# Patient Record
Sex: FEMALE | Race: BLACK OR AFRICAN AMERICAN | NOT HISPANIC OR LATINO | URBAN - METROPOLITAN AREA
[De-identification: names, ages, dates, MRNs, and addresses within clinical notes are randomized per-mention and may not be internally consistent; named-entity substitution may affect disease eponyms.]

---

## 2023-08-10 ENCOUNTER — OUTPATIENT (OUTPATIENT)
Dept: OUTPATIENT SERVICES | Facility: HOSPITAL | Age: 58
LOS: 1 days | End: 2023-08-10

## 2023-08-10 ENCOUNTER — APPOINTMENT (OUTPATIENT)
Dept: PLASTIC SURGERY | Facility: CLINIC | Age: 58
End: 2023-08-10

## 2023-08-10 VITALS
WEIGHT: 185 LBS | TEMPERATURE: 98.2 F | RESPIRATION RATE: 15 BRPM | SYSTOLIC BLOOD PRESSURE: 115 MMHG | BODY MASS INDEX: 30.82 KG/M2 | HEART RATE: 76 BPM | HEIGHT: 65 IN | DIASTOLIC BLOOD PRESSURE: 75 MMHG

## 2023-08-10 PROBLEM — Z00.00 ENCOUNTER FOR PREVENTIVE HEALTH EXAMINATION: Status: ACTIVE | Noted: 2023-08-10

## 2023-08-10 RX ORDER — SEMAGLUTIDE 1.34 MG/ML
2 INJECTION, SOLUTION SUBCUTANEOUS
Refills: 0 | Status: ACTIVE | COMMUNITY

## 2023-08-10 NOTE — ASSESSMENT
[FreeTextEntry1] : Exam:  Nose: palpable and mobile subcutaneous dorsal silicone implant, tip slightly underprojected and underrotated Face: full face and neck wth bilateral jowling, submental fullness, and skin laxity of the neck without platysmal bands Symmetric facial animation and grossly normal sensation  JERRICA REED is a 58 year old female patient with unsatisfactory cosmetic appearance of the nose, lower face, and neck. Regarding her nose, we discussed that given the presence of her silicone implant, I would recommend against additional surgery and any further filler injections at this time due to elevated complication risks. She was understanding of this. With regard to her jowling and neck laxity, we discussed that a face/necklift could potentially address these issues. The risks of surgery were discussed with the patient including but not limited to unsatisfactory cosmetic appearance, infection, bleeding, fluid collection, delayed wound healing, and temporary or permanent nerve injury. All questions and concerns were addressed to her satisfaction. She will take some time to consider her options and contact me if she is interested in pursuing surgery.

## 2023-08-10 NOTE — HISTORY OF PRESENT ILLNESS
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 The patient is specifically concerned with the appearance of her nasal tip. She feels it is underprojected and under rotated. The patient has previous history of rhinoplasty 1 year ago in which a dorsal silicone implant was placed. She additionally has had filler injection into the nasal tip, but feels this was unsuccessful in providing her with the desired aesthetic result. She additionally is concerned with the jowling appearance of her lower face and excess skin of her neck. She denies difficulty breathing through her nose, allergies, or frequent use of nasal decongestants. She has no prior history of abnormal bleeding, blood clotting, or adverse reactions to anesthesia.

## 2023-08-11 DIAGNOSIS — Z01.89 ENCOUNTER FOR OTHER SPECIFIED SPECIAL EXAMINATIONS: ICD-10-CM

## 2024-02-15 ENCOUNTER — APPOINTMENT (OUTPATIENT)
Dept: PLASTIC SURGERY | Facility: CLINIC | Age: 59
End: 2024-02-15

## 2024-02-15 ENCOUNTER — OUTPATIENT (OUTPATIENT)
Dept: OUTPATIENT SERVICES | Facility: HOSPITAL | Age: 59
LOS: 1 days | End: 2024-02-15

## 2024-02-15 DIAGNOSIS — Z01.818 ENCOUNTER FOR OTHER PREPROCEDURAL EXAMINATION: ICD-10-CM

## 2024-02-23 ENCOUNTER — OUTPATIENT (OUTPATIENT)
Dept: OUTPATIENT SERVICES | Facility: HOSPITAL | Age: 59
LOS: 1 days | End: 2024-02-23

## 2024-02-23 ENCOUNTER — APPOINTMENT (OUTPATIENT)
Dept: PLASTIC SURGERY | Facility: CLINIC | Age: 59
End: 2024-02-23

## 2024-02-23 DIAGNOSIS — Z01.818 ENCOUNTER FOR OTHER PREPROCEDURAL EXAMINATION: ICD-10-CM

## 2024-03-22 RX ORDER — TOPIRAMATE 25 MG
1 TABLET ORAL
Refills: 0 | DISCHARGE

## 2024-03-22 RX ORDER — MUPIROCIN 20 MG/G
1 OINTMENT TOPICAL
Qty: 1 | Refills: 0
Start: 2024-03-22

## 2024-03-22 RX ORDER — ONDANSETRON 8 MG/1
1 TABLET, FILM COATED ORAL
Qty: 12 | Refills: 0
Start: 2024-03-22

## 2024-03-22 RX ORDER — ROSUVASTATIN CALCIUM 5 MG/1
1 TABLET ORAL
Refills: 0 | DISCHARGE

## 2024-03-22 RX ORDER — OXYCODONE HYDROCHLORIDE 5 MG/1
1 TABLET ORAL
Qty: 15 | Refills: 0
Start: 2024-03-22

## 2024-03-22 RX ORDER — ACETAMINOPHEN 500 MG
2 TABLET ORAL
Refills: 0 | DISCHARGE

## 2024-03-22 RX ORDER — CHOLECALCIFEROL (VITAMIN D3) 125 MCG
1 CAPSULE ORAL
Refills: 0 | DISCHARGE

## 2024-03-22 RX ORDER — DIAZEPAM 5 MG
1 TABLET ORAL
Qty: 3 | Refills: 0
Start: 2024-03-22

## 2024-03-22 NOTE — ASU PATIENT PROFILE, ADULT - NSICDXPASTSURGICALHX_GEN_ALL_CORE_FT
PAST SURGICAL HISTORY:  H/O breast augmentation     History of abdominoplasty     History of rhinoplasty

## 2024-03-22 NOTE — ASU PATIENT PROFILE, ADULT - FALL HARM RISK - UNIVERSAL INTERVENTIONS
Bed in lowest position, wheels locked, appropriate side rails in place/Call bell, personal items and telephone in reach/Instruct patient to call for assistance before getting out of bed or chair/Non-slip footwear when patient is out of bed/[place] to call system/Physically safe environment - no spills, clutter or unnecessary equipment/Purposeful Proactive Rounding/Room/bathroom lighting operational, light cord in reach

## 2024-03-24 ENCOUNTER — TRANSCRIPTION ENCOUNTER (OUTPATIENT)
Age: 59
End: 2024-03-24

## 2024-03-25 ENCOUNTER — OUTPATIENT (OUTPATIENT)
Dept: OUTPATIENT SERVICES | Facility: HOSPITAL | Age: 59
LOS: 1 days | Discharge: HOME | End: 2024-03-25

## 2024-03-25 ENCOUNTER — TRANSCRIPTION ENCOUNTER (OUTPATIENT)
Age: 59
End: 2024-03-25

## 2024-03-25 VITALS
TEMPERATURE: 100 F | OXYGEN SATURATION: 100 % | DIASTOLIC BLOOD PRESSURE: 81 MMHG | WEIGHT: 176.37 LBS | RESPIRATION RATE: 18 BRPM | SYSTOLIC BLOOD PRESSURE: 126 MMHG | HEART RATE: 75 BPM | HEIGHT: 66 IN

## 2024-03-25 VITALS
HEART RATE: 88 BPM | RESPIRATION RATE: 16 BRPM | SYSTOLIC BLOOD PRESSURE: 116 MMHG | OXYGEN SATURATION: 98 % | DIASTOLIC BLOOD PRESSURE: 72 MMHG

## 2024-03-25 DIAGNOSIS — Z98.82 BREAST IMPLANT STATUS: Chronic | ICD-10-CM

## 2024-03-25 DIAGNOSIS — Z98.890 OTHER SPECIFIED POSTPROCEDURAL STATES: Chronic | ICD-10-CM

## 2024-03-25 RX ORDER — ACETAMINOPHEN 500 MG
1000 TABLET ORAL ONCE
Refills: 0 | Status: COMPLETED | OUTPATIENT
Start: 2024-03-25 | End: 2024-03-25

## 2024-03-25 RX ORDER — APREPITANT 80 MG/1
40 CAPSULE ORAL ONCE
Refills: 0 | Status: COMPLETED | OUTPATIENT
Start: 2024-03-25 | End: 2024-03-25

## 2024-03-25 RX ORDER — SODIUM CHLORIDE 9 MG/ML
1000 INJECTION, SOLUTION INTRAVENOUS
Refills: 0 | Status: DISCONTINUED | OUTPATIENT
Start: 2024-03-25 | End: 2024-03-25

## 2024-03-25 RX ORDER — FENTANYL CITRATE 50 UG/ML
25 INJECTION INTRAVENOUS
Refills: 0 | Status: DISCONTINUED | OUTPATIENT
Start: 2024-03-25 | End: 2024-03-25

## 2024-03-25 RX ADMIN — Medication 400 MILLIGRAM(S): at 19:10

## 2024-03-25 RX ADMIN — Medication 1000 MILLIGRAM(S): at 12:10

## 2024-03-25 RX ADMIN — APREPITANT 40 MILLIGRAM(S): 80 CAPSULE ORAL at 12:10

## 2024-03-25 RX ADMIN — Medication 1000 MILLIGRAM(S): at 19:36

## 2024-03-25 NOTE — ASU DISCHARGE PLAN (ADULT/PEDIATRIC) - ASU DC SPECIAL INSTRUCTIONSFT
Please follow up with Dr. Franco as instructed.    Follow preprinted instructions given pre-op    Please take all medications sent to you as prescribed.

## 2024-03-25 NOTE — ASU DISCHARGE PLAN (ADULT/PEDIATRIC) - CARE PROVIDER_API CALL
Alli Franco  Plastic Surgery  [address] [place] [id number]  Phone: [phone]  Fax: [phone]  Follow Up Time:

## 2024-03-25 NOTE — PRE-ANESTHESIA EVALUATION ADULT - NSANTHOSAYNRD_GEN_A_CORE
No. JOHNY screening performed.  STOP BANG Legend: 0-2 = LOW Risk; 3-4 = INTERMEDIATE Risk; 5-8 = HIGH Risk

## 2024-03-25 NOTE — PRE-OP CHECKLIST - SELECT TESTS ORDERED
BMP/CBC/EKG/Results in MD note BMP/CBC/CMP/PT/PTT/Hepatic Function/Type and Screen/EKG/Results in MD note

## 2024-03-25 NOTE — ASU DISCHARGE PLAN (ADULT/PEDIATRIC) - NS MD DC FALL RISK RISK
For information on Fall & Injury Prevention, visit: [email or web address] OR  [email or web address] OR  https://www.cdc.gov/steadi/patient.html

## 2024-03-25 NOTE — PRE-ANESTHESIA EVALUATION ADULT - BP NONINVASIVE DIASTOLIC (MM HG)
Disp Refills Start End    lisinopril (ZESTRIL) 20 MG tablet 90 tablet 3 10/12/2020     Sig - Route: Take 1 tablet by mouth daily. - Oral    Sent to pharmacy as: Lisinopril 20 MG Oral Tablet (ZESTRIL)      Sent message to pharmacy   81

## 2024-04-01 ENCOUNTER — OUTPATIENT (OUTPATIENT)
Dept: OUTPATIENT SERVICES | Facility: HOSPITAL | Age: 59
LOS: 1 days | End: 2024-04-01

## 2024-04-01 ENCOUNTER — APPOINTMENT (OUTPATIENT)
Dept: PLASTIC SURGERY | Facility: CLINIC | Age: 59
End: 2024-04-01

## 2024-04-01 DIAGNOSIS — Z98.890 OTHER SPECIFIED POSTPROCEDURAL STATES: Chronic | ICD-10-CM

## 2024-04-01 DIAGNOSIS — Z98.82 BREAST IMPLANT STATUS: Chronic | ICD-10-CM

## 2024-04-01 PROBLEM — E78.00 PURE HYPERCHOLESTEROLEMIA, UNSPECIFIED: Chronic | Status: ACTIVE | Noted: 2024-03-22

## 2024-04-01 PROBLEM — G43.909 MIGRAINE, UNSPECIFIED, NOT INTRACTABLE, WITHOUT STATUS MIGRAINOSUS: Chronic | Status: ACTIVE | Noted: 2024-03-22

## 2024-04-01 PROBLEM — Z87.39 PERSONAL HISTORY OF OTHER DISEASES OF THE MUSCULOSKELETAL SYSTEM AND CONNECTIVE TISSUE: Chronic | Status: ACTIVE | Noted: 2024-03-22

## 2024-04-02 VITALS — SYSTOLIC BLOOD PRESSURE: 147 MMHG | HEART RATE: 83 BPM | DIASTOLIC BLOOD PRESSURE: 96 MMHG | RESPIRATION RATE: 15 BRPM

## 2024-04-05 DIAGNOSIS — Z48.812 ENCOUNTER FOR SURGICAL AFTERCARE FOLLOWING SURGERY ON THE CIRCULATORY SYSTEM: ICD-10-CM

## 2024-04-11 ENCOUNTER — APPOINTMENT (OUTPATIENT)
Dept: PLASTIC SURGERY | Facility: CLINIC | Age: 59
End: 2024-04-11

## 2024-04-11 ENCOUNTER — OUTPATIENT (OUTPATIENT)
Dept: OUTPATIENT SERVICES | Facility: HOSPITAL | Age: 59
LOS: 1 days | End: 2024-04-11

## 2024-04-11 DIAGNOSIS — Z98.82 BREAST IMPLANT STATUS: Chronic | ICD-10-CM

## 2024-04-11 DIAGNOSIS — Z98.890 OTHER SPECIFIED POSTPROCEDURAL STATES: Chronic | ICD-10-CM

## 2024-04-18 DIAGNOSIS — Z48.812 ENCOUNTER FOR SURGICAL AFTERCARE FOLLOWING SURGERY ON THE CIRCULATORY SYSTEM: ICD-10-CM

## 2024-04-25 ENCOUNTER — APPOINTMENT (OUTPATIENT)
Dept: PLASTIC SURGERY | Facility: CLINIC | Age: 59
End: 2024-04-25

## 2024-04-25 ENCOUNTER — OUTPATIENT (OUTPATIENT)
Dept: OUTPATIENT SERVICES | Facility: HOSPITAL | Age: 59
LOS: 1 days | End: 2024-04-25

## 2024-04-25 DIAGNOSIS — Z98.890 OTHER SPECIFIED POSTPROCEDURAL STATES: Chronic | ICD-10-CM

## 2024-04-25 DIAGNOSIS — Z98.82 BREAST IMPLANT STATUS: Chronic | ICD-10-CM

## 2024-04-25 NOTE — ASSESSMENT
[FreeTextEntry1] : JERRICA REED is a 58 year old female who presents 1 month s/p face and necklift. She reports no concerns at this time.   Exam: Incisions healing well, appropriate edema, facial motor nerves intact and symmetric.  A/P: 58 year F s/p face and neck lift. Doing well postoperatively. Aftercare discussed. RTC in June.

## 2024-05-02 DIAGNOSIS — Z48.812 ENCOUNTER FOR SURGICAL AFTERCARE FOLLOWING SURGERY ON THE CIRCULATORY SYSTEM: ICD-10-CM

## 2024-05-14 NOTE — ASSESSMENT
[FreeTextEntry1] : JERRICA REED is a 58 year old female who presents 1 week s/p face and neck lift. She reports no concerns   Exam: Incisions healing well Appropriate edema and ecchymosis Motor intact and symmetric   A/P: 58 year F s/p face and neck lift on 3/25/24, doing well. Sutures removed during today's visit. Reviewed continued aftercare, all questions answered. RTC 3 weeks.

## 2024-05-14 NOTE — ASSESSMENT
[FreeTextEntry1] : JERRICA REED is a 58 year old female who presents 2 weeks s/p face and neck lift. She reports no concerns  Exam: Incisions healing well Appropriate edema and ecchymosis, improved from prior Motor intact and symmetric  A/P: 58 year F s/p face and neck lift on 3/25/24, doing well.  All questions answered. RTC 2 weeks.

## 2024-06-06 ENCOUNTER — OUTPATIENT (OUTPATIENT)
Dept: OUTPATIENT SERVICES | Facility: HOSPITAL | Age: 59
LOS: 1 days | End: 2024-06-06

## 2024-06-06 ENCOUNTER — APPOINTMENT (OUTPATIENT)
Dept: PLASTIC SURGERY | Facility: CLINIC | Age: 59
End: 2024-06-06

## 2024-06-06 DIAGNOSIS — Z98.890 OTHER SPECIFIED POSTPROCEDURAL STATES: Chronic | ICD-10-CM

## 2024-06-06 DIAGNOSIS — Z98.82 BREAST IMPLANT STATUS: Chronic | ICD-10-CM

## 2024-06-10 DIAGNOSIS — Z48.812 ENCOUNTER FOR SURGICAL AFTERCARE FOLLOWING SURGERY ON THE CIRCULATORY SYSTEM: ICD-10-CM

## 2024-06-20 NOTE — ASSESSMENT
[FreeTextEntry1] : JERRICA REED is a 58 year old female who presents s/p face and necklift on 3/25/24. She reports no concerns at this time.  Exam: Incisions healing well, some hypertrophic scar in postauricular sulcus bilaterally, appropriate edema, facial motor nerves intact and symmetric.  A/P: 58 year F s/p face and neck lift. Doing well postoperatively. Kenalog-10 injected to bilateral postauricular scars. Aftercare discussed. RTC PRN.

## (undated) DEVICE — SUT PDS II 3-0 18" PS-2 UNDYED

## (undated) DEVICE — PACK FACIALPLASTY

## (undated) DEVICE — SUT MAXON 2-0 36" C-14

## (undated) DEVICE — GLV 7.5 PROTEXIS (WHITE)

## (undated) DEVICE — SUT MERSILENE 4-0 18" FS-2

## (undated) DEVICE — WARMING BLANKET LOWER ADULT

## (undated) DEVICE — SUT VICRYL 4-0 18" PS-2 UNDYED

## (undated) DEVICE — ELCTR BOVIE TIP BLADE INSULATED 2.8" EDGE WITH SAFETY

## (undated) DEVICE — PREP BETADINE SPONGE STICKS

## (undated) DEVICE — SUT PLAIN GUT 5-0 18" P-3

## (undated) DEVICE — VENODYNE/SCD SLEEVE CALF MEDIUM

## (undated) DEVICE — GLV 6 PROTEXIS (WHITE)

## (undated) DEVICE — ELCTR PENCIL SMOKE EVACUATOR COATED PUSH BUTTON 70MM

## (undated) DEVICE — SUT PDS II 4-0 18" PS-2 UNDYED

## (undated) DEVICE — DRAIN RESERVOIR FOR JACKSON PRATT 100CC CARDINAL

## (undated) DEVICE — GOWN ROYAL SILK XL

## (undated) DEVICE — SYR LUER LOK 20CC

## (undated) DEVICE — DRSG GAUZE MOISTURIZER 0.5 OZ 4X8

## (undated) DEVICE — SUT PLAIN GUT FAST ABSORBING 5-0 PC-1

## (undated) DEVICE — ELCTR STRYKER NEPTUNE BLADE COATED, INSULATED 70MM

## (undated) DEVICE — DRAIN JACKSON PRATT 7MM FLAT FULL NO TROCAR

## (undated) DEVICE — SUT PDS II PLUS 5-0 18" P-3 UNDYED

## (undated) DEVICE — BAG DECANTER IV STERILE

## (undated) DEVICE — DRAPE MEDIUM SHEET 44" X 70"

## (undated) DEVICE — VISITEC 4X4

## (undated) DEVICE — PETRI DISH MED 3.5"

## (undated) DEVICE — SUT PROLENE 2-0 30" SH

## (undated) DEVICE — SUT ETHILON 5-0 18" P-3

## (undated) DEVICE — RAYTEC SPONGE 4X4 16PLY

## (undated) DEVICE — DRSG SURG-O-FLEX 2 X 24"